# Patient Record
Sex: FEMALE | Race: BLACK OR AFRICAN AMERICAN | Employment: UNEMPLOYED | URBAN - METROPOLITAN AREA
[De-identification: names, ages, dates, MRNs, and addresses within clinical notes are randomized per-mention and may not be internally consistent; named-entity substitution may affect disease eponyms.]

---

## 2022-01-06 ENCOUNTER — HOSPITAL ENCOUNTER (EMERGENCY)
Age: 27
Discharge: HOME | End: 2022-01-06
Attending: EMERGENCY MEDICINE
Payer: COMMERCIAL

## 2022-01-06 VITALS
TEMPERATURE: 98.6 F | WEIGHT: 150 LBS | SYSTOLIC BLOOD PRESSURE: 144 MMHG | OXYGEN SATURATION: 100 % | BODY MASS INDEX: 24.11 KG/M2 | HEIGHT: 66 IN | RESPIRATION RATE: 18 BRPM | DIASTOLIC BLOOD PRESSURE: 93 MMHG | HEART RATE: 65 BPM

## 2022-01-06 DIAGNOSIS — N30.00 ACUTE CYSTITIS WITHOUT HEMATURIA: Primary | ICD-10-CM

## 2022-01-06 LAB
APPEARANCE UR: ABNORMAL
BACTERIA URNS QL MICRO: ABNORMAL /HPF
BILIRUB UR QL: NEGATIVE
CASTS URNS QL MICRO: ABNORMAL /LPF
COLOR UR: YELLOW
EPI CELLS #/AREA URNS HPF: ABNORMAL /HPF
GLUCOSE UR STRIP.AUTO-MCNC: NEGATIVE MG/DL
HCG UR QL: NEGATIVE
HGB UR QL STRIP: ABNORMAL
KETONES UR QL STRIP.AUTO: ABNORMAL MG/DL
LEUKOCYTE ESTERASE UR QL STRIP.AUTO: ABNORMAL
MUCOUS THREADS URNS QL MICRO: ABNORMAL /LPF
NITRITE UR QL STRIP.AUTO: NEGATIVE
OTHER OBSERVATIONS,UCOM: ABNORMAL
PH UR STRIP: 6 [PH] (ref 5–9)
PROT UR STRIP-MCNC: ABNORMAL MG/DL
RBC #/AREA URNS HPF: ABNORMAL /HPF
SP GR UR REFRACTOMETRY: 1.03 (ref 1–1.02)
UROBILINOGEN UR QL STRIP.AUTO: 1 EU/DL (ref 0.2–1)
WBC URNS QL MICRO: >100 /HPF

## 2022-01-06 PROCEDURE — 87086 URINE CULTURE/COLONY COUNT: CPT

## 2022-01-06 PROCEDURE — 87088 URINE BACTERIA CULTURE: CPT

## 2022-01-06 PROCEDURE — 81025 URINE PREGNANCY TEST: CPT

## 2022-01-06 PROCEDURE — 99284 EMERGENCY DEPT VISIT MOD MDM: CPT

## 2022-01-06 PROCEDURE — 87186 SC STD MICRODIL/AGAR DIL: CPT

## 2022-01-06 PROCEDURE — 81001 URINALYSIS AUTO W/SCOPE: CPT

## 2022-01-06 RX ORDER — CEPHALEXIN 500 MG/1
500 CAPSULE ORAL 2 TIMES DAILY
Qty: 14 CAPSULE | Refills: 0 | Status: SHIPPED | OUTPATIENT
Start: 2022-01-06 | End: 2022-01-13

## 2022-01-06 NOTE — ED TRIAGE NOTES
Patient to triage with c/o foul smell with her urine that started about 2 weeks ago. Patient denies any vaginal discharge. States she mild off and on pelvic pain.

## 2022-01-06 NOTE — ED NOTES
I have reviewed discharge instructions with the patient. The patient verbalized understanding. Patient left ED via Discharge Method: ambulatory to Home with daughter. Opportunity for questions and clarification provided. Patient given 1 scripts. Keflex. Work excuse given. Push fluids. To continue your aftercare when you leave the hospital, you may receive an automated call from our care team to check in on how you are doing. This is a free service and part of our promise to provide the best care and service to meet your aftercare needs.  If you have questions, or wish to unsubscribe from this service please call [phone]. Thank you for Choosing our [facility].

## 2022-01-06 NOTE — Clinical Note
[id number] [address] EMERGENCY DEPT  [address] [address] [phone]    Work/School Note    Date: 1/6/2022    To Whom It May concern:      Fabian Limon was seen and treated today in the emergency room by the following provider(s):  Attending Provider: Gibran Pearson DO  Nurse Practitioner: Angelica Baker NP. Fabian Limon is excused from work/school on 01/06/22. She is clear to return to work/school on 01/07/22.         Sincerely,          Micaela Lopez NP

## 2022-01-06 NOTE — DISCHARGE INSTRUCTIONS
Start Keflex twice a day for 7 days for treatment of urinary tract infection. Ensure drink lots of fluids. Follow-up with primary care or return to the ED for any new or worsening symptoms.

## 2022-01-06 NOTE — ED PROVIDER NOTES
PHUONG Diaz is a  24yo F, here for medical check up. Reports urinary sx and has concern for UTI x2 weeks. Malodorous urine, dysuria, urgency with suprapubic discomfort. No NVD, CP, SOB, no fever. No vaginal d/c. No concern for STDs. LMP irregular, last in December, no BC    No past medical history on file. No past surgical history on file. No family history on file. Social History     Socioeconomic History    Marital status: SINGLE     Spouse name: Not on file    Number of children: Not on file    Years of education: Not on file    Highest education level: Not on file   Occupational History    Not on file   Tobacco Use    Smoking status: Never Smoker    Smokeless tobacco: Never Used   Substance and Sexual Activity    Alcohol use: No    Drug use: No    Sexual activity: Not on file   Other Topics Concern    Not on file   Social History Narrative    Not on file     Social Determinants of Health     Financial Resource Strain:     Difficulty of Paying Living Expenses: Not on file   Food Insecurity:     Worried About Running Out of Food in the Last Year: Not on file    Mya of Food in the Last Year: Not on file   Transportation Needs:     Lack of Transportation (Medical): Not on file    Lack of Transportation (Non-Medical):  Not on file   Physical Activity:     Days of Exercise per Week: Not on file    Minutes of Exercise per Session: Not on file   Stress:     Feeling of Stress : Not on file   Social Connections:     Frequency of Communication with Friends and Family: Not on file    Frequency of Social Gatherings with Friends and Family: Not on file    Attends Jainism Services: Not on file    Active Member of Clubs or Organizations: Not on file    Attends Club or Organization Meetings: Not on file    Marital Status: Not on file   Intimate Partner Violence:     Fear of Current or Ex-Partner: Not on file    Emotionally Abused: Not on file    Physically Abused: Not on file   [facility] Sexually Abused: Not on file   Housing Stability:     Unable to Pay for Housing in the Last Year: Not on file    Number of Places Lived in the Last Year: Not on file    Unstable Housing in the Last Year: Not on file         ALLERGIES: Patient has no known allergies. Review of Systems   Constitutional: Negative for activity change, appetite change and fever. HENT: Negative for congestion and sore throat. Respiratory: Negative for shortness of breath. Gastrointestinal: Positive for abdominal pain (suprapubic pain). Negative for diarrhea, nausea and vomiting. Genitourinary: Positive for dysuria, frequency and urgency. Negative for difficulty urinating and vaginal discharge. Musculoskeletal: Negative for arthralgias. Skin: Negative for wound. Neurological: Negative for headaches. Hematological: Negative. There were no vitals filed for this visit. Physical Exam  Constitutional:       Appearance: Normal appearance. She is not ill-appearing. HENT:      Mouth/Throat:      Mouth: Mucous membranes are moist.      Pharynx: Oropharynx is clear. Eyes:      Pupils: Pupils are equal, round, and reactive to light. Cardiovascular:      Rate and Rhythm: Normal rate and regular rhythm. Pulmonary:      Effort: Pulmonary effort is normal. No respiratory distress. Breath sounds: Normal breath sounds. Abdominal:      General: Bowel sounds are normal.      Tenderness: There is no abdominal tenderness. There is no guarding. Comments: nonsurgical abd     Musculoskeletal:      Cervical back: Normal range of motion. Skin:     General: Skin is warm. Neurological:      General: No focal deficit present. Mental Status: She is alert and oriented to person, place, and time. Mental status is at baseline. Psychiatric:         Mood and Affect: Mood normal.         Thought Content:  Thought content normal.      Comments: Patient is well appearing          ELLI    Satish Mcallister is a  24yo F, here for medical check up. No urinary complaints. Urine ordered from triage is concerning for acute cystitis. Moderate leuks. Negative for nitrites. Will send for culture. Keflex ordered. Strict return precautions given, safe for d/c at this time. Vitals stable. Dispo: Stable, Discharge to home    Diagnosis:   1. Acute cystitis. 2.    Chana Bailey NP  3:46 PM      Judah Apgar, NP; 2022 @3:46 PM Voice dictation software was used during the making of this note. This software is not perfect and grammatical and other typographical errors may be present.   This note has not been proofread for errors.  ===================================================================

## 2022-01-09 LAB
BACTERIA SPEC CULT: ABNORMAL
BACTERIA SPEC CULT: ABNORMAL
SERVICE CMNT-IMP: ABNORMAL

## 2022-01-10 NOTE — PROGRESS NOTES
ED pharmacy student  reviewed recent results of urine culture. The patient received a prescription for cephalexin 500mg BID for 7 days  upon discharge. Based on culture results, the patient has been adequately treated for the identified infection with existing antimicrobial therapy. No further intervention needed. Allergies as of 01/06/2022    (No Known Allergies)     Neida Parmar PharmD Candidate    I agree with the pharmacy student's assessment and treatment plan.     Vidal Hanna, PharmD  Emergency Medicine Clinical Pharmacist

## 2022-03-15 ENCOUNTER — HOSPITAL ENCOUNTER (EMERGENCY)
Age: 27
Discharge: HOME | End: 2022-03-15
Attending: EMERGENCY MEDICINE
Payer: COMMERCIAL

## 2022-03-15 VITALS
OXYGEN SATURATION: 98 % | WEIGHT: 150 LBS | BODY MASS INDEX: 24.11 KG/M2 | RESPIRATION RATE: 14 BRPM | DIASTOLIC BLOOD PRESSURE: 86 MMHG | SYSTOLIC BLOOD PRESSURE: 126 MMHG | HEIGHT: 66 IN | HEART RATE: 74 BPM | TEMPERATURE: 98.7 F

## 2022-03-15 DIAGNOSIS — H10.31 ACUTE BACTERIAL CONJUNCTIVITIS OF RIGHT EYE: Primary | ICD-10-CM

## 2022-03-15 PROCEDURE — 99283 EMERGENCY DEPT VISIT LOW MDM: CPT

## 2022-03-15 PROCEDURE — 74011000250 HC RX REV CODE- 250: Performed by: PHYSICIAN ASSISTANT

## 2022-03-15 RX ORDER — CIPROFLOXACIN HYDROCHLORIDE 3.5 MG/ML
1 SOLUTION/ DROPS TOPICAL 2 TIMES DAILY
Qty: 2.5 ML | Refills: 0 | Status: SHIPPED | OUTPATIENT
Start: 2022-03-15 | End: 2022-03-25

## 2022-03-15 RX ORDER — TETRACAINE HYDROCHLORIDE 5 MG/ML
1 SOLUTION OPHTHALMIC
Status: COMPLETED | OUTPATIENT
Start: 2022-03-15 | End: 2022-03-15

## 2022-03-15 RX ADMIN — TETRACAINE HYDROCHLORIDE 1 DROP: 5 SOLUTION OPHTHALMIC at 08:48

## 2022-03-15 RX ADMIN — FLUORESCEIN SODIUM 1 STRIP: 1 STRIP OPHTHALMIC at 08:49

## 2022-03-15 NOTE — ED PROVIDER NOTES
HPI Cisco Fleischer is 31 y.o. female who presents to the emergency department for evaluation of right eye pain. She complains of 4 day history of right eye redness. She reports associated pain, drainage and photophobia. She also notes purulent drainage upon waking in the morning. She believes she has conjunctivitis. She wears contact lenses, not wearing them currently. She denies any trauma or possibility of foreign body. She denies aggravating or alleviating factors. No past medical history on file. No past surgical history on file. No family history on file. Social History     Socioeconomic History    Marital status: SINGLE     Spouse name: Not on file    Number of children: Not on file    Years of education: Not on file    Highest education level: Not on file   Occupational History    Not on file   Tobacco Use    Smoking status: Never Smoker    Smokeless tobacco: Never Used   Substance and Sexual Activity    Alcohol use: No    Drug use: No    Sexual activity: Not on file   Other Topics Concern    Not on file   Social History Narrative    Not on file     Social Determinants of Health     Financial Resource Strain:     Difficulty of Paying Living Expenses: Not on file   Food Insecurity:     Worried About Running Out of Food in the Last Year: Not on file    Mya of Food in the Last Year: Not on file   Transportation Needs:     Lack of Transportation (Medical): Not on file    Lack of Transportation (Non-Medical):  Not on file   Physical Activity:     Days of Exercise per Week: Not on file    Minutes of Exercise per Session: Not on file   Stress:     Feeling of Stress : Not on file   Social Connections:     Frequency of Communication with Friends and Family: Not on file    Frequency of Social Gatherings with Friends and Family: Not on file    Attends Scientology Services: Not on file    Active Member of Clubs or Organizations: Not on file    Attends Club or Organization Meetings: Not on file    Marital Status: Not on file   Intimate Partner Violence:     Fear of Current or Ex-Partner: Not on file    Emotionally Abused: Not on file    Physically Abused: Not on file    Sexually Abused: Not on file   Housing Stability:     Unable to Pay for Housing in the Last Year: Not on file    Number of [place] in the Last Year: Not on file    Unstable Housing in the Last Year: Not on file         ALLERGIES: Patient has no known allergies. Review of Systems   Eyes: Positive for photophobia, pain, redness and visual disturbance. All other systems reviewed and are negative. Vitals:    03/15/22 0837   BP: 126/86   Pulse: 74   Resp: 14   Temp: 98.7 °F (37.1 °C)   SpO2: 98%   Weight: 68 kg (150 lb)   Height: 5' 6\" (1.676 m)            Physical Exam  Vitals and nursing note reviewed. Constitutional:       Appearance: Normal appearance. Eyes:      Extraocular Movements: Extraocular movements intact. Pupils: Pupils are equal, round, and reactive to light. Comments: Conjunctivae is injected. The eye was stained with fluorescin and examined under a wood's lamp. No corneal abrasions. No foreign bodies. Cardiovascular:      Rate and Rhythm: Normal rate. Pulmonary:      Effort: Pulmonary effort is normal.   Musculoskeletal:         General: Normal range of motion. Skin:     General: Skin is warm and dry. Neurological:      General: No focal deficit present. Mental Status: She is alert and oriented to person, place, and time. Psychiatric:         Mood and Affect: Mood normal.          MDM  Number of Diagnoses or Management Options  Acute bacterial conjunctivitis of right eye: new and requires workup  Patient Progress  Patient progress: stable    ED Course as of 03/15/22 0926   Tue Mar 15, 2022   0910 Plan to treat for conjunctivitis with cipro. She is instructed to avoid contact lens wear x 2 weeks. She is discharged to home.  Results, plan of care and return precautions discussed with the patient. They verbalize understanding and ability to comply.     [AE]      ED Course User Index  [AE] ROXANA Pagan       Procedures

## 2022-03-15 NOTE — DISCHARGE INSTRUCTIONS
Medication as prescribed. Do not wear contact lenses x 2 weeks. Return or follow up with your PCP for increasing redness, pain, vision changes, worsening symptoms, other concerns. Orders are placed.  Please have her schedule a fasting lab appt.  Thanks.

## 2022-03-15 NOTE — ED NOTES
I have reviewed discharge instructions with the patient. The patient verbalized understanding. Patient left ED via Discharge Method: ambulatory to Home with self. Opportunity for questions and clarification provided. Patient given 1 scripts. To continue your aftercare when you leave the hospital, you may receive an automated call from our care team to check in on how you are doing. This is a free service and part of our promise to provide the best care and service to meet your aftercare needs.  If you have questions, or wish to unsubscribe from this service please call [phone]. Thank you for Choosing our [facility] Emergency Department.

## 2022-08-28 ENCOUNTER — HOSPITAL ENCOUNTER (EMERGENCY)
Age: 27
Discharge: HOME | End: 2022-08-28
Attending: EMERGENCY MEDICINE
Payer: MEDICAID

## 2022-08-28 VITALS
SYSTOLIC BLOOD PRESSURE: 137 MMHG | HEART RATE: 72 BPM | WEIGHT: 150 LBS | HEIGHT: 66 IN | BODY MASS INDEX: 24.11 KG/M2 | DIASTOLIC BLOOD PRESSURE: 88 MMHG | TEMPERATURE: 98.1 F | RESPIRATION RATE: 18 BRPM | OXYGEN SATURATION: 98 %

## 2022-08-28 DIAGNOSIS — Z11.3 SCREEN FOR STD (SEXUALLY TRANSMITTED DISEASE): Primary | ICD-10-CM

## 2022-08-28 DIAGNOSIS — R30.0 DYSURIA: ICD-10-CM

## 2022-08-28 LAB
HCG UR QL: NEGATIVE
SERVICE CMNT-IMP: NORMAL
WET PREP GENITAL: NORMAL
WET PREP GENITAL: NORMAL

## 2022-08-28 PROCEDURE — 99284 EMERGENCY DEPT VISIT MOD MDM: CPT

## 2022-08-28 PROCEDURE — 87210 SMEAR WET MOUNT SALINE/INK: CPT

## 2022-08-28 PROCEDURE — 96372 THER/PROPH/DIAG INJ SC/IM: CPT

## 2022-08-28 PROCEDURE — 2500000003 HC RX 250 WO HCPCS: Performed by: PHYSICIAN ASSISTANT

## 2022-08-28 PROCEDURE — 81025 URINE PREGNANCY TEST: CPT

## 2022-08-28 PROCEDURE — 6370000000 HC RX 637 (ALT 250 FOR IP): Performed by: PHYSICIAN ASSISTANT

## 2022-08-28 PROCEDURE — 6360000002 HC RX W HCPCS: Performed by: PHYSICIAN ASSISTANT

## 2022-08-28 PROCEDURE — 87491 CHLMYD TRACH DNA AMP PROBE: CPT

## 2022-08-28 RX ORDER — AZITHROMYCIN 250 MG/1
1000 TABLET, FILM COATED ORAL ONCE
Status: COMPLETED | OUTPATIENT
Start: 2022-08-28 | End: 2022-08-28

## 2022-08-28 RX ADMIN — AZITHROMYCIN MONOHYDRATE 1000 MG: 250 TABLET ORAL at 17:28

## 2022-08-28 RX ADMIN — LIDOCAINE HYDROCHLORIDE 500 MG: 10 INJECTION, SOLUTION INFILTRATION; PERINEURAL at 17:23

## 2022-08-28 ASSESSMENT — ENCOUNTER SYMPTOMS
NAUSEA: 0
BACK PAIN: 0
VOMITING: 0
ABDOMINAL PAIN: 0
SHORTNESS OF BREATH: 0

## 2022-08-28 ASSESSMENT — PAIN - FUNCTIONAL ASSESSMENT: PAIN_FUNCTIONAL_ASSESSMENT: NONE - DENIES PAIN

## 2022-08-28 NOTE — DISCHARGE INSTRUCTIONS
We would love to help you get a primary care doctor for follow-up after your emergency department visit. Please call [phone] between 7AM - 6PM Monday to Friday. A care navigator will be able to assist you with setting up a doctor close to your home. Urine was normal here today without evidence of UTI. Gonorrhea and Chlamydia testing is pending. We have treated you here today but she will still be notified of your results in a couple days time. Follow-up with your family doctor. Return to the ED as needed.

## 2022-08-28 NOTE — ED TRIAGE NOTES
Patient presents to the ED for STD testing. Reports \"feels weird when I pee. \" When asked directly if she has concerns regarding STDs, the patient reports yes but doesn't elaborate.

## 2022-08-28 NOTE — ED PROVIDER NOTES
[facility] Emergency Department Provider Note                   PCP:                None Provider               Age: 28 y.o. Sex: female       ICD-10-CM    1. Screen for STD (sexually transmitted disease)  Z11.3       2. Dysuria  R30.0           DISPOSITION Decision To Discharge 08/28/2022 05:17:33 PM        MDM  Number of Diagnoses or Management Options  Screen for STD (sexually transmitted disease)  Diagnosis management comments: Patient is a 79-year-old female who presents episode today with some dysuria. Vital signs are stable. Urine shows no signs of infection. Gonorrhea and Chlamydia testing pending. Patient's concern was high enough she preferred to be treated here today. Patient given Rocephin IM and some oral azithromycin due to concerns about treating with doxycycline for 7 days and the patient's reliability to do so appropriately. We will notify the patient of those results when they return. Encourage patient to avoid intercourse for the next 10 days to allow for adequate treatment. Results discussed with the patient here today as well as plan of care to which she is agreeable. Patient ambulatory upon discharge in stable condition out of the department. Amount and/or Complexity of Data Reviewed  Clinical lab tests: ordered and reviewed  Tests in the medicine section of CPT®: reviewed and ordered  Review and summarize past medical records: yes  Independent visualization of images, tracings, or specimens: yes    Risk of Complications, Morbidity, and/or Mortality  Presenting problems: low  Diagnostic procedures: low  Management options: low  General comments: The patient was observed in the ED.     Results Reviewed:      Recent Results (from the past 24 hour(s))  -Wet prep, genital:   Collection Time: 08/28/22  3:55 PM  Specimen: Vaginal       Result                      Value             Ref Range           Special Requests                                              NO SPECIAL REQUESTS       Wet Prep                                                      NO YEAST,TRICHOMONAS OR CLUE CELLS NOTED       Wet Prep                                                      1 TO 5 WBC PER HPF  -POC Pregnancy Urine Qual:   Collection Time: 08/28/22  4:01 PM       Result                      Value             Ref Range           Preg Test, Ur               Negative          NEG                Patient Progress  Patient progress: stable       Orders Placed This Encounter   Procedures    C.trachomatis N.gonorrhoeae DNA    Wet prep, genital    POCT Urine Dipstick    POC PREGNANCY UR-QUAL    POC Pregnancy Urine Qual        Medications   azithromycin (ZITHROMAX) tablet 1,000 mg (has no administration in time range)   cefTRIAXone (ROCEPHIN) 500 mg in lidocaine 1 % 1.4286 mL IM Injection (500 mg IntraMUSCular Given 8/28/22 1723)       New Prescriptions    No medications on file        Katie Ta is a 28 y.o. female who presents to the Emergency Department with chief complaint of    Chief Complaint   Patient presents with    Exposure to STD     Patient presents to the ED for STD testing. Reports \"feels weird when I pee. \" When asked directly if she has concerns regarding STDs, the patient reports yes but doesn't elaborate. Patient is a 79-year-old female who presents to facility today after having a \"not normal feeling while urinating\" that started today. She denies any other complaints here today. She states she would like to be tested for STDs. She denies any known exposures. Patient would not elaborate any further on her concern. Patient has no fevers, chills, frequency, urgency, abdominal pain, vaginal bleeding or discharge, or any other symptoms here today. The history is provided by the patient. Review of Systems   Constitutional:  Negative for chills and fever. Respiratory:  Negative for shortness of breath. Cardiovascular:  Negative for chest pain.    Gastrointestinal:  Negative for abdominal pain, nausea and vomiting. Genitourinary:  Positive for dysuria. Negative for frequency and urgency. Musculoskeletal:  Negative for back pain and gait problem. Neurological:  Negative for dizziness, syncope and headaches. Psychiatric/Behavioral:  Negative for agitation and behavioral problems. All other systems reviewed and are negative. No past medical history on file. No past surgical history on file. No family history on file. Social History     Socioeconomic History    Marital status: Single   Tobacco Use    Smoking status: Never    Smokeless tobacco: Never   Substance and Sexual Activity    Alcohol use: No    Drug use: No         Patient has no known allergies. Previous Medications    No medications on file        Vitals signs and nursing note reviewed. Patient Vitals for the past 4 hrs:   Temp Pulse Resp BP SpO2   08/28/22 1556 98.1 °F (36.7 °C) 72 18 137/88 98 %          Physical Exam  Vitals and nursing note reviewed. Constitutional:       General: She is not in acute distress. Appearance: Normal appearance. She is not ill-appearing. HENT:      Head: Normocephalic and atraumatic. Right Ear: External ear normal.      Left Ear: External ear normal.   Eyes:      Extraocular Movements: Extraocular movements intact. Conjunctiva/sclera: Conjunctivae normal.   Cardiovascular:      Rate and Rhythm: Normal rate and regular rhythm. Pulses: Normal pulses. Heart sounds: Normal heart sounds. Pulmonary:      Effort: Pulmonary effort is normal.      Breath sounds: Normal breath sounds. Abdominal:      General: Abdomen is flat. Genitourinary:     Comments:     Musculoskeletal:         General: Normal range of motion. Cervical back: Normal range of motion. Skin:     General: Skin is warm and dry. Capillary Refill: Capillary refill takes less than 2 seconds. Neurological:      General: No focal deficit present.       Mental Status: She is alert and oriented to person, place, and time. Psychiatric:         Mood and Affect: Mood normal.         Behavior: Behavior normal.        Procedures      [unfilled]     No orders to display                          Voice dictation software was used during the making of this note. This software is not perfect and grammatical and other typographical errors may be present. This note has not been completely proofread for errors.      [employer], EMMANUEL  08/28/22 0480

## 2022-08-31 LAB
C TRACH RRNA SPEC QL NAA+PROBE: NEGATIVE
N GONORRHOEA RRNA SPEC QL NAA+PROBE: NEGATIVE
SPECIMEN SOURCE: NORMAL

## 2023-06-25 ENCOUNTER — HOSPITAL ENCOUNTER (EMERGENCY)
Age: 28
Discharge: HOME | End: 2023-06-25
Attending: EMERGENCY MEDICINE
Payer: COMMERCIAL

## 2023-06-25 VITALS
SYSTOLIC BLOOD PRESSURE: 131 MMHG | HEIGHT: 66 IN | BODY MASS INDEX: 29.32 KG/M2 | OXYGEN SATURATION: 98 % | HEART RATE: 71 BPM | RESPIRATION RATE: 18 BRPM | WEIGHT: 182.4 LBS | DIASTOLIC BLOOD PRESSURE: 88 MMHG | TEMPERATURE: 98.6 F

## 2023-06-25 DIAGNOSIS — N92.6 IRREGULAR MENSES: ICD-10-CM

## 2023-06-25 DIAGNOSIS — N39.0 URINARY TRACT INFECTION IN FEMALE: Primary | ICD-10-CM

## 2023-06-25 DIAGNOSIS — A59.9 TRICHOMONAS INFECTION: ICD-10-CM

## 2023-06-25 LAB
APPEARANCE UR: ABNORMAL
BACTERIA URNS QL MICRO: ABNORMAL /HPF
BILIRUB UR QL: NEGATIVE
CASTS URNS QL MICRO: 0 /LPF
COLOR UR: ABNORMAL
CRYSTALS URNS QL MICRO: 0 /LPF
EPI CELLS #/AREA URNS HPF: ABNORMAL /HPF
GLUCOSE UR STRIP.AUTO-MCNC: NEGATIVE MG/DL
HCG SERPL QL: NEGATIVE
HCG UR QL: NEGATIVE
HGB UR QL STRIP: ABNORMAL
KETONES UR QL STRIP.AUTO: NEGATIVE MG/DL
LEUKOCYTE ESTERASE UR QL STRIP.AUTO: ABNORMAL
MUCOUS THREADS URNS QL MICRO: 0 /LPF
NITRITE UR QL STRIP.AUTO: POSITIVE
OTHER OBSERVATIONS: ABNORMAL
PH UR STRIP: 5.5 (ref 5–9)
PROT UR STRIP-MCNC: 100 MG/DL
RBC #/AREA URNS HPF: ABNORMAL /HPF
SERVICE CMNT-IMP: NORMAL
SP GR UR REFRACTOMETRY: 1.02 (ref 1–1.02)
UROBILINOGEN UR QL STRIP.AUTO: 1 EU/DL (ref 0.2–1)
WBC URNS QL MICRO: ABNORMAL /HPF
WET PREP GENITAL: NORMAL

## 2023-06-25 PROCEDURE — 87591 N.GONORRHOEAE DNA AMP PROB: CPT

## 2023-06-25 PROCEDURE — 99284 EMERGENCY DEPT VISIT MOD MDM: CPT

## 2023-06-25 PROCEDURE — 6360000002 HC RX W HCPCS: Performed by: NURSE PRACTITIONER

## 2023-06-25 PROCEDURE — 87210 SMEAR WET MOUNT SALINE/INK: CPT

## 2023-06-25 PROCEDURE — 96372 THER/PROPH/DIAG INJ SC/IM: CPT

## 2023-06-25 PROCEDURE — 87491 CHLMYD TRACH DNA AMP PROBE: CPT

## 2023-06-25 PROCEDURE — 81025 URINE PREGNANCY TEST: CPT

## 2023-06-25 PROCEDURE — 2500000003 HC RX 250 WO HCPCS: Performed by: NURSE PRACTITIONER

## 2023-06-25 PROCEDURE — 84703 CHORIONIC GONADOTROPIN ASSAY: CPT

## 2023-06-25 PROCEDURE — 81001 URINALYSIS AUTO W/SCOPE: CPT

## 2023-06-25 RX ORDER — ONDANSETRON 4 MG/1
4 TABLET, FILM COATED ORAL 3 TIMES DAILY PRN
Qty: 9 TABLET | Refills: 0 | Status: SHIPPED | OUTPATIENT
Start: 2023-06-25 | End: 2023-06-28

## 2023-06-25 RX ORDER — CEPHALEXIN 500 MG/1
500 CAPSULE ORAL 2 TIMES DAILY
Qty: 14 CAPSULE | Refills: 0 | Status: SHIPPED | OUTPATIENT
Start: 2023-06-25 | End: 2023-07-02

## 2023-06-25 RX ORDER — METRONIDAZOLE 500 MG/1
500 TABLET ORAL 2 TIMES DAILY
Qty: 14 TABLET | Refills: 0 | Status: SHIPPED | OUTPATIENT
Start: 2023-06-25 | End: 2023-07-02

## 2023-06-25 RX ORDER — DOXYCYCLINE HYCLATE 100 MG
100 TABLET ORAL 2 TIMES DAILY
Qty: 14 TABLET | Refills: 0 | Status: SHIPPED | OUTPATIENT
Start: 2023-06-25 | End: 2023-07-02

## 2023-06-25 RX ADMIN — CEFTRIAXONE SODIUM 500 MG: 500 INJECTION, POWDER, FOR SOLUTION INTRAMUSCULAR; INTRAVENOUS at 16:27

## 2023-06-25 ASSESSMENT — PAIN SCALES - GENERAL: PAINLEVEL_OUTOF10: 5

## 2023-06-25 ASSESSMENT — LIFESTYLE VARIABLES
HOW OFTEN DO YOU HAVE A DRINK CONTAINING ALCOHOL: NEVER
HOW MANY STANDARD DRINKS CONTAINING ALCOHOL DO YOU HAVE ON A TYPICAL DAY: PATIENT DOES NOT DRINK

## 2023-06-25 ASSESSMENT — PAIN - FUNCTIONAL ASSESSMENT: PAIN_FUNCTIONAL_ASSESSMENT: 0-10

## 2023-11-30 ENCOUNTER — HOSPITAL ENCOUNTER (EMERGENCY)
Age: 28
Discharge: LEFT BEFORE DISCHARGE | End: 2023-11-30
Payer: COMMERCIAL

## 2023-11-30 VITALS
RESPIRATION RATE: 16 BRPM | SYSTOLIC BLOOD PRESSURE: 157 MMHG | TEMPERATURE: 97.7 F | HEIGHT: 66 IN | BODY MASS INDEX: 29.73 KG/M2 | DIASTOLIC BLOOD PRESSURE: 89 MMHG | OXYGEN SATURATION: 100 % | HEART RATE: 70 BPM | WEIGHT: 185 LBS

## 2023-11-30 LAB
APPEARANCE UR: CLEAR
BACTERIA URNS QL MICRO: ABNORMAL /HPF
BILIRUB UR QL: NEGATIVE
COLOR UR: ABNORMAL
EPI CELLS #/AREA URNS HPF: ABNORMAL /HPF
GLUCOSE UR STRIP.AUTO-MCNC: NEGATIVE MG/DL
HCG UR QL: NEGATIVE
HGB UR QL STRIP: ABNORMAL
KETONES UR QL STRIP.AUTO: 15 MG/DL
LEUKOCYTE ESTERASE UR QL STRIP.AUTO: NEGATIVE
MUCOUS THREADS URNS QL MICRO: ABNORMAL /LPF
NITRITE UR QL STRIP.AUTO: NEGATIVE
PH UR STRIP: 6 (ref 5–9)
PROT UR STRIP-MCNC: ABNORMAL MG/DL
RBC #/AREA URNS HPF: ABNORMAL /HPF
SERVICE CMNT-IMP: NORMAL
SP GR UR REFRACTOMETRY: 1.03 (ref 1–1.02)
UROBILINOGEN UR QL STRIP.AUTO: 1 EU/DL (ref 0.2–1)
WBC URNS QL MICRO: ABNORMAL /HPF
WET PREP GENITAL: NORMAL

## 2023-11-30 PROCEDURE — 87491 CHLMYD TRACH DNA AMP PROBE: CPT

## 2023-11-30 PROCEDURE — 87210 SMEAR WET MOUNT SALINE/INK: CPT

## 2023-11-30 PROCEDURE — 87086 URINE CULTURE/COLONY COUNT: CPT

## 2023-11-30 PROCEDURE — 87591 N.GONORRHOEAE DNA AMP PROB: CPT

## 2023-11-30 PROCEDURE — 4500000002 HC ER NO CHARGE

## 2023-11-30 PROCEDURE — 81025 URINE PREGNANCY TEST: CPT

## 2023-11-30 PROCEDURE — 81001 URINALYSIS AUTO W/SCOPE: CPT

## 2023-11-30 ASSESSMENT — PAIN - FUNCTIONAL ASSESSMENT: PAIN_FUNCTIONAL_ASSESSMENT: 0-10

## 2023-11-30 ASSESSMENT — LIFESTYLE VARIABLES: HOW MANY STANDARD DRINKS CONTAINING ALCOHOL DO YOU HAVE ON A TYPICAL DAY: PATIENT DOES NOT DRINK

## 2023-11-30 ASSESSMENT — PAIN SCALES - GENERAL: PAINLEVEL_OUTOF10: 0
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
## 2023-11-30 NOTE — ED NOTES
[facility]  INTERVENTIONAL PAIN MANAGEMENT FOLLOW UP    CHIEF COMPLAINT:  Follow-up and Office Visit       PERTINENT CARE TEAM:    PCP: Fuad Walton MD               Previous: [facility]        SUBJECTIVE:    Sandy Gonsalez is a 39 year old female seen today for follow up for chronic generalized pain which began years previously, as well as neck and low back pain. This is my first visit with the patient, as she was seen by JAMIE Saba while I was on maternity leave.  She states that her pain really has localized to the left greater than right low back with occasional tightness sensation in the bilateral shins, worsened by lying down and alleviated by standing, sitting, and walking.  She rates her discomfort 6/10 on average and describes it is achy, stiff, and intermittent in character.    She was last seen via virtual visit on 11/30/20 by JAMIE Saba, at that time she discontinued tizanidine in favor of baclofen, and continued LDN 4.5mg. She has also since started PT and shares this is providing some relief.     Pt denies numbness/tingling/weakness, bowel/bladder incontinence, saddle anesthesia, recent fever, infection, or antibiotic use.     PAIN COURSE AND PREVIOUS INTERVENTIONS:  Medications: LDN 4.5 mg [Aripiprazole, Sertraline, Ambien, Lorazepam]  Past:   Tizanidine (ineffective)  Flexeril (ineffective)  Lidocaine patches  Meloxicam  Trazodone  Voltaren gel- not helpful   Gabapentin and tegretol- ineffective  Lyrica- tinnitus  Cymbalta  Oxycodone              Mirapex-did not want to try d/t possible side effects  Topamax: Blurred vision, sees yellow     Interventions: 9/20/16: B/L L4-5, L5-S1 facet joint injection (Dr Lopez)  Adjuvants: TENS unit, Physical therapy and chiropractic care with some benefit     BLOOD THINNING MEDICATIONS:  Naproxen     Pertinent Surgical History:  2012 bilateral total mastectomy     Mental Health/Substance Use  No answer for discharge.  RN checking with PA about possible prescriptions     Warren Gonzalez RN  11/30/23 2967 History:  Anxiety  (Aripiprazole, Sertraline, Ambien, Lorazepam)  PTSD  Schizoaffective disorder  Major Depressive Disorder  Insomnia  Nicotine dependence  Hx of suicidal/ homicidal ideation      Pertinent Comorbidities:  Fibromyalgia  Asthma  Prediabetes  Hyperthyroidism  Hx breast cancer s/p b/l mastectomy 2012    MEDICATIONS:    Current Outpatient Medications   Medication Sig Dispense Refill   • methIMAzole (TAPAZOLE) 5 MG tablet Take 0.5 tablets by mouth daily. 45 tablet 3   • baclofen (LIORESAL) 10 MG tablet Take 1 tablet by mouth 3 times daily as needed (muscle spasm). 90 tablet 1   • naltrexone 4.5 mg capsule Take 1 capsule by mouth daily. 30 g 5   • ergocalciferol (Drisdol) 1.25 mg (50,000 units) capsule Take 1 capsule by mouth 1 day a week. 12 capsule 3   • sertraline (ZOLOFT) 50 MG tablet Take 1 tablet by mouth daily. PATIENT NEEDS TO MAKE AN APPT. 30 tablet 1   • hydrOXYzine (ATARAX) 50 MG tablet Take 1 tablet by mouth 3 times daily as needed for Anxiety. 90 tablet 1   • ARIPiprazole (ABILIFY) 5 MG tablet Take 1 tablet by mouth daily. 30 tablet 1   • diphenhydrAMINE (BENADRYL) 2 % cream Apply topically 3 times daily as needed for Itching. 30 g 0   • pantoprazole (PROTONIX) 40 MG tablet Take 1 tablet by mouth 2 times daily for 14 days. 28 tablet 0   • polyethylene glycol-electrolytes (NULYTELY) 420 g solution Follow instructions in prep letter 4000 mL 0   • dicyclomine (BENTYL) 20 MG tablet Take 1 tablet by mouth 4 times daily (before meals and nightly). 360 tablet 1   • pantoprazole (PROTONIX) 20 MG tablet Take 1 tablet by mouth daily (before breakfast). 90 tablet 1     No current facility-administered medications for this visit.        PROBLEM LIST:    Patient Active Problem List   Diagnosis   • Headache(784.0)   • Carpal tunnel syndrome   • Positive MRSA culture Lf thigh pustule   • Nicotine dependence   • Breast cancer (CMS/HCC)   • Acquired absence of both breasts and nipples   • Posttraumatic stress  disorder   • Chronic leg pain   • PTSD (post-traumatic stress disorder)   • Peripheral edema   • Unspecified vitamin D deficiency   • Myositis   • Elevated CPK   • Hallucinations   • Suicidal ideation   • Homicidal ideation   • Chronic pain   • Major depressive disorder, recurrent episode, moderate (CMS/HCC)   • Multiple thyroid nodules   • Nonspecific abnormal results of thyroid function study   • Dysplasia of cervix, low grade (JAY 1)   • Fibromyalgia   • Schizoaffective disorder, depressive type (CMS/HCC)   • Hyperthyroidism   • Right thyroid nodule   • Anxiety   • Migraine   • Segmental and somatic dysfunction of lumbar region   • Segmental and somatic dysfunction of thoracic region   • Closed dislocation, multiple cervical vertebrae   • Mild persistent asthma without complication   • Malignant neoplasm of right female breast (CMS/HCC)   • Goiter diffuse   • Family history of cancer   • Chronic bilateral low back pain without sciatica   • Gastroesophageal reflux disease without esophagitis   • Candidal enteritis   • BRCA negative   • Myofascial muscle pain   • Segmental dysfunction of lumbar region   • Segmental dysfunction of pelvic region   • Segmental dysfunction of sacral region   • Segmental dysfunction of thoracic region   • Prediabetes   • RICHARD (generalized anxiety disorder)   • Insomnia due to mental disorder   • Personal history of malignant neoplasm of breast   • Capsular contracture of breast implant   • Deformity and disproportion of reconstructed breast   • Mild intermittent asthma without complication       HISTORIES:    ALLERGIES:   Allergen Reactions   • Acetaminophen Other (See Comments)     Pt does not know- states she was just told to avoid   • Adhesive   (Environmental) RASH     IE: adhesive tape, bandaids   • Dayquil [Day Time] RASH   • Dichloralphenazone Other (See Comments)     (an ingredient in Midrin)   • Geodon [Ziprasidone Hydrochloride] Tinnitus   • Hydrocodone HIVES   • Ibuprofen  Other (See Comments)     Unsure of reaction   • Isometheptene Mucate Other (See Comments)     (an ingredient in midrin)   • Lyrica Other (See Comments)     Ear ringing   • Meloxicam Other (See Comments)     Ear ringing   • Midrin      Inflammation of lips   • Morphine Other (See Comments)     hallucinations       Past Medical History:   Diagnosis Date   • Allergy    • Arthritis    • Asthma    • Bipolar disorder (CMS/[facility])    • Breast cancer (CMS/[facility]) 06/13/2012    right  breast poorly differentiated invasive ductal   • Chronic tension-type headache    • Closed bimalleolar fracture 01/02/2008    R ankle   • Closed fracture of metatarsal bone(s) 01/02/2008    R 5th metatarsal   • Depression    • Fibromyalgia     lower back   • Gastroesophageal reflux disease    • GBS carrier    • Graves disease    • Lupus (CMS/[facility])    • Mild cervical dysplasia 2014   • MRSA (methicillin resistant Staphylococcus aureus) infection  6/28/09 thigh+    History Code resolved by Infection Prevention 4/4/14   • Myositis    • Neuropathy due to chemotherapeutic drug (CMS/[facility])    • Positive MRSA culture Lf thigh pustule 9/18/10    also had several other pustules - forearm, etc. states areas are healed now.   • Prediabetes    • PTSD (post-traumatic stress disorder)    • Sinus problem     chronic   • Thyroid nodule    • Vitamin D deficiency       Past Surgical History:   Procedure Laterality Date   • Abdomen surgery     • Breast implants-replacement 2 hrs  4/30/2014    bilateral remove implants and place new larger implants.  Bilateral fat grafting- Dr Carranza   • Breast reconstruction  8/8/2012    immediate reconstruction silicone implants - Dr Carranza   • Breast surgery  6/13/2012    right breast biopsy   • Colonoscopy  01/08/2021    dr domínguez recall 10 yr   • Colonoscopy w biopsy  07/14/2009   • Cosmetic surgery     • Egd  01/07/2021    dr domínguez   • Esophagogastroduodenoscopy transoral flex diag  8/7/14    Dyspepsia   • Mastectomy  8/2012     bilateral (with right sentinel node bx)   • Nipple/areola reconstruction  2/25/2013    bilateral nipple reconstruction/alloderm - Dr Carranza   • Revision of reconstructed breast  1/18/2016    Bilateral revision of breast reconstruction with extensive soft tissue release and extensive capsulotomies- Dr Carranza   • Skin biopsy      thigh myosititis check   • Tubal ligation  10/01/2011   • [place] tooth extraction          Social History     Socioeconomic History   • Marital status: Single     Spouse name: Not on file   • Number of children: 3   • Years of education: 13+   • Highest education level: Not on file   Occupational History   • Occupation: unemployed     Employer: OTHER   Social Needs   • Financial resource strain: Not on file   • Food insecurity     Worry: Not on file     Inability: Not on file   • Transportation needs     Medical: Not on file     Non-medical: Not on file   Tobacco Use   • Smoking status: Current Every Day Smoker     Packs/day: 0.50     Years: 10.00     Pack years: 5.00     Types: Cigarettes   • Smokeless tobacco: Never Used   • Tobacco comment: trying to cut back   Substance and Sexual Activity   • Alcohol use: Yes     Alcohol/week: 0.0 standard drinks     Frequency: Monthly or less     Drinks per session: 1 or 2     Binge frequency: Never     Comment: socially   • Drug use: Yes     Types: Marijuana   • Sexual activity: Yes     Partners: Male     Birth control/protection: Surgical     Comment: tubal ligation   Lifestyle   • Physical activity     Days per week: Not on file     Minutes per session: Not on file   • Stress: Not on file   Relationships   • Social connections     Talks on phone: Not on file     Gets together: Not on file     Attends [employer] service: Not on file     Active member of club or organization: Not on file     Attends meetings of clubs or organizations: Not on file     Relationship status: Not on file   • Intimate partner violence     Fear of current or ex partner:  Not on file     Emotionally abused: Not on file     Physically abused: Not on file     Forced sexual activity: Not on file   Other Topics Concern   •  Service No   • Blood Transfusions No   • Caffeine Concern Yes   • Occupational Exposure No   • Hobby Hazards No   • Sleep Concern Yes   • Stress Concern Yes   • Weight Concern No   • Special Diet No   • Back Care No   • Exercise No   • Bike Helmet No   • Seat Belt Yes   • Self-Exams Yes   Social History Narrative   • Not on file       I have reviewed the family history and social history as listed in the medical record as obtained by my nursing staff and support staff and agree with their documentation.    REVIEW OF SYSTEMS:    Reviewed. As per RN note and my History of Present Illness above      OBJECTIVE:    PHYSICAL EXAMINATION:  Vitals:   Visit Vitals  /67   Pulse 86   LMP 12/02/2020 (Approximate) Comment: waiver signed 1/8/2021   SpO2 99%       Constitutional: Pleasant,  Well-developed, well-nourished female in no acute distress. Gait is antalgic but not ataxic  Head: normocephalic, atraumatic  Eye: PERRL. Conjuctiva non-injected  Skin: Warm, dry, intact without rash or lesion.  Psych: The patient's mood is flat.  Cardiovascular: well-perfused extremities, no peripheral edema  Pulmonary:  Non-labored respirations, no stridor.  Abdomen: Non-distended  Neurologic: Coordination intact, Facial nerves intact.  Musculoskeletal:     MOTOR EXAMINATION:       LOWER EXTREMITY      RIGHT LEFT   Iliopsoas 5/5 5/5   Quadriceps 5/5 5/5   Hamstrings 5/5 5/5   Foot Dorsiflexion 5/5 5/5   Extensor hallucis longus 5/5 5/5   Gastrocnemius 5/5 5/5     No abnormal muscle tone, movements, or tremors noted. No muscular atrophy.    Sensation intact to light touch over bilateral lower extremities    No pain elicited in Right groin with hip abduction/adduction/internal rotation/external rotation     No pain elicited in Left groin with hip abduction/adduction/internal  rotation/external rotation     LUMBAR SPINE:    The thoracolumbar spine has a normal alignment      TENDERNESS   Lumbar paraspinal musculature: tender on the left  Sacroiliac joints: tender L >R    ROM: full extension, flexion, rotation, right lateral bend, left lateral bend    Pain with flexion: negative  Pain with extension: positive    negative lumbar facet loading    Non TTP overlying lumbar facet joints    Straight leg raising is negative bilaterally    + L sided NASRIN     LABORATORY DATA:    PLT   Date Value   01/16/2021 215 K/mcL   02/05/2019 280 K/mcL   02/11/2013 187 K/mcL   03/22/2010 175 K/uL     No results found for: INR  GFR Estimate, Non  (no units)   Date Value   02/05/2019 83     GFR Estimate,  (no units)   Date Value   02/05/2019 >90     Hemoglobin A1C (%)   Date Value   03/06/2020 5.8 (H)   02/05/2019 5.8 (H)         IMAGING STUDIES:       IMAGING STUDIES:    MRI Lumbar Spine 07/13/18  Outside prior lumbar spine MRI of 8/21/2015 has been uploaded for review.  Degenerative findings are grossly similar to prior.  No changes will be made to the prior report.   FINDINGS:   Vertebrae: 5 lumbar-type vertebral bodies are present with small T12 ribs. Vertebral body heights are preserved. Minimal levocurvature lumbar spine centered at L3.  Bone Marrow:  Normal bone marrow signal for age.  Small subcentimeter probable atypical hemangioma at L1.  No destructive lesions identified.  Soft tissues:  No signal abnormalities.  Posterior paraspinal muscles:  Well preserved without signal abnormality.  Spinal Cord: Conus medullaris terminates at L1.Distal cord and nerve roots are of normal signal intensity.  Degenerative changes: Mild inflammatory degeneration along the superior endplate of L5.  Mild  intervertebral disc height and T2 signal loss at L4-L5 and L5-S1.  Small posterior fissures are also at suggested these levels.  Degenerative changes by level:  T12-L3: No abnormalities.     L3-L4:Minimal asymmetric to left disc bulge without significant spinal canal stenosis.  Minimal bilateral foraminal narrowing due to asymmetric left disc bulge and facet arthropathy.  Patent right foramen.  L4-L5: Asymmetric to left disc bulge with superimposed left subarticular/foraminal disc protrusion.  No significant spinal canal stenosis.  Mild effacement of left lateral recess.  Mild left and minimal right foraminal narrowing due to asymmetric to left disc bulge, left subarticular/foraminal disc protrusion and facet arthropathy.  L5-S1: Minimal asymmetric to left disc bulge without significant spinal canal or foraminal stenosis.     IMPRESSION:    1.  Mild multilevel degenerative disc changes without significant spinal canal stenosis.  Mild effacement of left lateral recess at L4-L5.  2.  Degenerative foraminal narrowing is mild at left L4-L5 and minimal at bilateral L3-L4 and right L4-L5.  3.  Small posterior annular fissures suggested at L4-L5 and L5-S1.       and         XR Lumbar Spine 2 or 3 Views 02/14/20   IMPRESSION: Stable mild degenerative changes.        XR CERVICAL, THORACIC, LUMBAR 03/06/2018  FINDINGS:   CERVICAL SPINE: Mild straightening and reversal the normal cervical lordosis, centered at the C4 level. New mild to moderate spondylotic endplate degenerative changes at the C4-5 level. Associated mild facet and uncovertebral joint arthropathy in the mid cervical spine.  THORACIC SPINE: Mild thoracolumbar curve. Thoracic spine is otherwise radiographically unremarkable. Postoperative changes both breasts.  LUMBAR SPINE: Mild thoracolumbar curve. Mild L4-5 interspace narrowing.  Mild lower lumbar facet degenerative changes.  IMPRESSION:  1.  Mild spondylotic degenerative arthropathy, worsened at the C4-5 level since 2009.  2.  Mild thoracolumbar curve.  3.  Mild L4-5 interspace narrowing.  4.  Postoperative changes both breasts.      I have personally reviewed the images pertinent to this  patient's visit today.    PDMP Reviewed    ASSESSMENT:    1. Lumbosacral spondylosis without myelopathy    2. Sacroiliac joint dysfunction       Sandy Gonsalez is a 39 year old female with a combination of lumbar spondylosis and sacroiliac joint dysfunction, particularly affecting the left side.    PLAN:  RECOMMENDATIONS:  1) Medical Modalities:  No changes  2) Interventional Modalities:  Left sacroiliac joint injection  3) Behavioral Medicine Modalities: none  4) Other Modalities:  SI joint exercises provided, continue physical therapy, Continue Home Exercise Therapy  5) Imaging/Labs:  None  6) Consults:  None      No orders of the defined types were placed in this encounter.      Return for return to clinic 2-3 weeks after procedure.    Instructions provided as documented in the after visit summary.    The above recommendations were provided to the patient. Diagnosis, treatment options, risks, benefits, and alternatives were discussed, and all questions were answered to the patient's satisfaction. The patient expressed understanding of the diagnosis and plan for management and agreed with the plan of care.      Shruthi Andrews MD  Interventional Pain Management  [facility]

## 2023-12-02 LAB
BACTERIA SPEC CULT: NORMAL
BACTERIA SPEC CULT: NORMAL
C TRACH RRNA SPEC QL NAA+PROBE: NEGATIVE
N GONORRHOEA RRNA SPEC QL NAA+PROBE: NEGATIVE
SERVICE CMNT-IMP: NORMAL
SPECIMEN SOURCE: NORMAL

## 2024-01-21 ENCOUNTER — HOSPITAL ENCOUNTER (EMERGENCY)
Age: 29
Discharge: HOME | End: 2024-01-21
Payer: COMMERCIAL

## 2024-01-21 VITALS
DIASTOLIC BLOOD PRESSURE: 83 MMHG | WEIGHT: 178 LBS | HEART RATE: 71 BPM | BODY MASS INDEX: 28.61 KG/M2 | RESPIRATION RATE: 16 BRPM | SYSTOLIC BLOOD PRESSURE: 143 MMHG | OXYGEN SATURATION: 100 % | TEMPERATURE: 98.5 F | HEIGHT: 66 IN

## 2024-01-21 DIAGNOSIS — N93.9 VAGINAL BLEEDING: Primary | ICD-10-CM

## 2024-01-21 LAB
ALBUMIN SERPL-MCNC: 3.8 G/DL (ref 3.5–5)
ALBUMIN/GLOB SERPL: 0.8 (ref 0.4–1.6)
ALP SERPL-CCNC: 70 U/L (ref 50–136)
ALT SERPL-CCNC: 18 U/L (ref 12–65)
ANION GAP SERPL CALC-SCNC: 2 MMOL/L (ref 2–11)
AST SERPL-CCNC: 13 U/L (ref 15–37)
BASOPHILS # BLD: 0.1 K/UL (ref 0–0.2)
BASOPHILS NFR BLD: 1 % (ref 0–2)
BILIRUB SERPL-MCNC: 0.6 MG/DL (ref 0.2–1.1)
BUN SERPL-MCNC: 9 MG/DL (ref 6–23)
CALCIUM SERPL-MCNC: 9.3 MG/DL (ref 8.3–10.4)
CHLORIDE SERPL-SCNC: 110 MMOL/L (ref 103–113)
CO2 SERPL-SCNC: 25 MMOL/L (ref 21–32)
CREAT SERPL-MCNC: 0.96 MG/DL (ref 0.6–1)
DIFFERENTIAL METHOD BLD: ABNORMAL
EOSINOPHIL # BLD: 0.1 K/UL (ref 0–0.8)
EOSINOPHIL NFR BLD: 1 % (ref 0.5–7.8)
ERYTHROCYTE [DISTWIDTH] IN BLOOD BY AUTOMATED COUNT: 12.9 % (ref 11.9–14.6)
GLOBULIN SER CALC-MCNC: 4.8 G/DL (ref 2.8–4.5)
GLUCOSE SERPL-MCNC: 89 MG/DL (ref 65–100)
HCG UR QL: NEGATIVE
HCT VFR BLD AUTO: 39.6 % (ref 35.8–46.3)
HGB BLD-MCNC: 12.8 G/DL (ref 11.7–15.4)
IMM GRANULOCYTES # BLD AUTO: 0 K/UL (ref 0–0.5)
IMM GRANULOCYTES NFR BLD AUTO: 0 % (ref 0–5)
LYMPHOCYTES # BLD: 2.2 K/UL (ref 0.5–4.6)
LYMPHOCYTES NFR BLD: 52 % (ref 13–44)
MCH RBC QN AUTO: 31.5 PG (ref 26.1–32.9)
MCHC RBC AUTO-ENTMCNC: 32.3 G/DL (ref 31.4–35)
MCV RBC AUTO: 97.5 FL (ref 82–102)
MONOCYTES # BLD: 0.4 K/UL (ref 0.1–1.3)
MONOCYTES NFR BLD: 8 % (ref 4–12)
NEUTS SEG # BLD: 1.6 K/UL (ref 1.7–8.2)
NEUTS SEG NFR BLD: 38 % (ref 43–78)
NRBC # BLD: 0 K/UL (ref 0–0.2)
PLATELET # BLD AUTO: 274 K/UL (ref 150–450)
PMV BLD AUTO: 10.3 FL (ref 9.4–12.3)
POTASSIUM SERPL-SCNC: 3.8 MMOL/L (ref 3.5–5.1)
PROT SERPL-MCNC: 8.6 G/DL (ref 6.3–8.2)
RBC # BLD AUTO: 4.06 M/UL (ref 4.05–5.2)
SODIUM SERPL-SCNC: 137 MMOL/L (ref 136–146)
WBC # BLD AUTO: 4.3 K/UL (ref 4.3–11.1)

## 2024-01-21 PROCEDURE — 80053 COMPREHEN METABOLIC PANEL: CPT

## 2024-01-21 PROCEDURE — 99283 EMERGENCY DEPT VISIT LOW MDM: CPT

## 2024-01-21 PROCEDURE — 81025 URINE PREGNANCY TEST: CPT

## 2024-01-21 PROCEDURE — 85025 COMPLETE CBC W/AUTO DIFF WBC: CPT

## 2024-01-21 RX ORDER — KETOROLAC TROMETHAMINE 30 MG/ML
30 INJECTION, SOLUTION INTRAMUSCULAR; INTRAVENOUS
Status: DISCONTINUED | OUTPATIENT
Start: 2024-01-21 | End: 2024-01-21

## 2024-01-21 ASSESSMENT — PAIN - FUNCTIONAL ASSESSMENT: PAIN_FUNCTIONAL_ASSESSMENT: 0-10

## 2024-01-21 NOTE — DISCHARGE INSTRUCTIONS
You were evaluated in the emergency department today for vaginal bleeding    Physical exam is very reassuring.  Negative pregnancy    Lab work is reassuring as well.  No evidence of anemia    Contact your OB/GYN tomorrow to schedule a follow-up next week    Alternate between ibuprofen and Tylenol for pain control.  You were given a dose of a medication called Toradol today that is like ibuprofen    Return to the emergency department if you are soaking through an extra thick menstrual pad and under 2 hours for more than 2 consecutive hours, soaking through a bleeding through a super large tampon and under 30 minutes for more than 2 consecutive hours, passing out or lightheadedness.

## 2024-01-21 NOTE — ED PROVIDER NOTES
Use    Smoking status: Never    Smokeless tobacco: Never   Vaping Use    Vaping Use: Never used   Substance and Sexual Activity    Alcohol use: No    Drug use: No        There are no discharge medications for this patient.       Results for orders placed or performed during the hospital encounter of 01/21/24   CBC with Auto Differential   Result Value Ref Range    WBC 4.3 4.3 - 11.1 K/uL    RBC 4.06 4.05 - 5.2 M/uL    Hemoglobin 12.8 11.7 - 15.4 g/dL    Hematocrit 39.6 35.8 - 46.3 %    MCV 97.5 82.0 - 102.0 FL    MCH 31.5 26.1 - 32.9 PG    MCHC 32.3 31.4 - 35.0 g/dL    RDW 12.9 11.9 - 14.6 %    Platelets 274 150 - 450 K/uL    MPV 10.3 9.4 - 12.3 FL    nRBC 0.00 0.0 - 0.2 K/uL    Differential Type AUTOMATED      Neutrophils % 38 (L) 43 - 78 %    Lymphocytes % 52 (H) 13 - 44 %    Monocytes % 8 4.0 - 12.0 %    Eosinophils % 1 0.5 - 7.8 %    Basophils % 1 0.0 - 2.0 %    Immature Granulocytes 0 0.0 - 5.0 %    Neutrophils Absolute 1.6 (L) 1.7 - 8.2 K/UL    Lymphocytes Absolute 2.2 0.5 - 4.6 K/UL    Monocytes Absolute 0.4 0.1 - 1.3 K/UL    Eosinophils Absolute 0.1 0.0 - 0.8 K/UL    Basophils Absolute 0.1 0.0 - 0.2 K/UL    Absolute Immature Granulocyte 0.0 0.0 - 0.5 K/UL   Comprehensive Metabolic Panel   Result Value Ref Range    Sodium 137 136 - 146 mmol/L    Potassium 3.8 3.5 - 5.1 mmol/L    Chloride 110 103 - 113 mmol/L    CO2 25 21 - 32 mmol/L    Anion Gap 2 2 - 11 mmol/L    Glucose 89 65 - 100 mg/dL    BUN 9 6 - 23 MG/DL    Creatinine 0.96 0.6 - 1.0 MG/DL    Est, Glom Filt Rate >60 >60 ml/min/1.73m2    Calcium 9.3 8.3 - 10.4 MG/DL    Total Bilirubin 0.6 0.2 - 1.1 MG/DL    ALT 18 12 - 65 U/L    AST 13 (L) 15 - 37 U/L    Alk Phosphatase 70 50 - 136 U/L    Total Protein 8.6 (H) 6.3 - 8.2 g/dL    Albumin 3.8 3.5 - 5.0 g/dL    Globulin 4.8 (H) 2.8 - 4.5 g/dL    Albumin/Globulin Ratio 0.8 0.4 - 1.6     POC Pregnancy Urine Qual   Result Value Ref Range    Preg Test, Ur Negative NEG          No orders to display

## 2024-01-21 NOTE — ED NOTES
I have reviewed discharge instructions with the patient.  The patient verbalized understanding.    Patient left ED via Discharge Method: ambulatory to Home with .    Opportunity for questions and clarification provided.       Patient given 0 scripts.         To continue your aftercare when you leave the hospital, you may receive an automated call from our care team to check in on how you are doing.  This is a free service and part of our promise to provide the best care and service to meet your aftercare needs.” If you have questions, or wish to unsubscribe from this service please call [phone].  Thank you for Choosing our [facility] Emergency Department.

## 2024-05-06 ENCOUNTER — HOSPITAL ENCOUNTER (EMERGENCY)
Age: 29
Discharge: HOME | End: 2024-05-06
Payer: COMMERCIAL

## 2024-05-06 VITALS
WEIGHT: 180 LBS | HEIGHT: 66 IN | SYSTOLIC BLOOD PRESSURE: 154 MMHG | OXYGEN SATURATION: 99 % | HEART RATE: 69 BPM | TEMPERATURE: 99.1 F | RESPIRATION RATE: 18 BRPM | BODY MASS INDEX: 28.93 KG/M2 | DIASTOLIC BLOOD PRESSURE: 94 MMHG

## 2024-05-06 DIAGNOSIS — Z11.3 SCREENING FOR STD (SEXUALLY TRANSMITTED DISEASE): Primary | ICD-10-CM

## 2024-05-06 LAB
BILIRUB UR QL: NEGATIVE
GLUCOSE UR QL STRIP.AUTO: NEGATIVE MG/DL
HCG UR QL: NEGATIVE
KETONES UR-MCNC: ABNORMAL MG/DL
LEUKOCYTE ESTERASE UR QL STRIP: NEGATIVE
NITRITE UR QL: NEGATIVE
PH UR: 6 (ref 5–9)
PROT UR QL: NEGATIVE MG/DL
RBC # UR STRIP: NEGATIVE
SERVICE CMNT-IMP: ABNORMAL
SERVICE CMNT-IMP: NORMAL
SP GR UR: 1.02 (ref 1–1.02)
UROBILINOGEN UR QL: 0.2 EU/DL (ref 0.2–1)
WET PREP GENITAL: NORMAL
WET PREP GENITAL: NORMAL

## 2024-05-06 PROCEDURE — 99283 EMERGENCY DEPT VISIT LOW MDM: CPT

## 2024-05-06 PROCEDURE — 87591 N.GONORRHOEAE DNA AMP PROB: CPT

## 2024-05-06 PROCEDURE — 81003 URINALYSIS AUTO W/O SCOPE: CPT

## 2024-05-06 PROCEDURE — 87210 SMEAR WET MOUNT SALINE/INK: CPT

## 2024-05-06 PROCEDURE — 87491 CHLMYD TRACH DNA AMP PROBE: CPT

## 2024-05-06 PROCEDURE — 81025 URINE PREGNANCY TEST: CPT

## 2024-05-07 NOTE — DISCHARGE INSTRUCTIONS
Urine sample and wet prep swab did not reveal any abnormalities.  Will call with results of gonorrhea/chlamydia testing in a few days to determine need for additional treatment.

## 2024-05-07 NOTE — ED PROVIDER NOTES
Emergency Department Provider Note       PCP: Yadira Prince APRN - NP   Age: 28 y.o.   Sex: female     DISPOSITION Decision To Discharge 05/06/2024 09:44:03 PM       ICD-10-CM    1. Screening for STD (sexually transmitted disease)  Z11.3           Medical Decision Making     Patient presents for STD screening.  She is asymptomatic on this account.  Arrives with stable vital signs in no apparent distress.    Her urinalysis and wet prep are negative.  Will discharge home and call with results of STD panel to determine need for additional treatment.     1 acute, uncomplicated illness or injury.      I independently ordered and reviewed each unique test.      History     Patient is a 28-year-old female with no pertinent past medical history presenting to the emergency department requesting STD screening.  She reports that her significant other did not return home yesterday evening and is concerned for infidelity.  She denies any vaginal discharge, bleeding, pelvic pain, genital rash or lesions.  She has no further complaints    The history is provided by the patient.       Physical Exam     Vitals signs and nursing note reviewed:  Vitals:    05/06/24 2041   BP: (!) 154/94   Pulse: 69   Resp: 18   Temp: 99.1 °F (37.3 °C)   TempSrc: Oral   SpO2: 99%   Weight: 81.6 kg (180 lb)   Height: 1.676 m (5' 6\")      Physical Exam  Vitals and nursing note reviewed.   Constitutional:       General: She is not in acute distress.     Appearance: Normal appearance. She is not ill-appearing.   HENT:      Head: Normocephalic and atraumatic.      Right Ear: External ear normal.      Left Ear: External ear normal.      Nose: Nose normal.   Eyes:      General: No scleral icterus.        Right eye: No discharge.         Left eye: No discharge.      Extraocular Movements: Extraocular movements intact.      Conjunctiva/sclera: Conjunctivae normal.      Pupils: Pupils are equal, round, and reactive to light.   Cardiovascular:      Rate and

## 2024-05-30 ENCOUNTER — APPOINTMENT (OUTPATIENT)
Dept: GENERAL RADIOLOGY | Age: 29
End: 2024-05-30
Payer: COMMERCIAL

## 2024-05-30 ENCOUNTER — HOSPITAL ENCOUNTER (EMERGENCY)
Age: 29
Discharge: HOME | End: 2024-05-30
Attending: EMERGENCY MEDICINE
Payer: COMMERCIAL

## 2024-05-30 VITALS
HEIGHT: 66 IN | HEART RATE: 69 BPM | WEIGHT: 180 LBS | OXYGEN SATURATION: 100 % | RESPIRATION RATE: 16 BRPM | TEMPERATURE: 98.3 F | SYSTOLIC BLOOD PRESSURE: 137 MMHG | DIASTOLIC BLOOD PRESSURE: 93 MMHG | BODY MASS INDEX: 28.93 KG/M2

## 2024-05-30 DIAGNOSIS — S92.324A CLOSED NONDISPLACED FRACTURE OF SECOND METATARSAL BONE OF RIGHT FOOT, INITIAL ENCOUNTER: Primary | ICD-10-CM

## 2024-05-30 DIAGNOSIS — S99.921A INJURY OF RIGHT FOOT, INITIAL ENCOUNTER: ICD-10-CM

## 2024-05-30 PROCEDURE — 99283 EMERGENCY DEPT VISIT LOW MDM: CPT

## 2024-05-30 PROCEDURE — 6370000000 HC RX 637 (ALT 250 FOR IP): Performed by: EMERGENCY MEDICINE

## 2024-05-30 PROCEDURE — 73630 X-RAY EXAM OF FOOT: CPT

## 2024-05-30 RX ORDER — IBUPROFEN 800 MG/1
800 TABLET ORAL EVERY 8 HOURS PRN
Qty: 21 TABLET | Refills: 0 | Status: SHIPPED | OUTPATIENT
Start: 2024-05-30 | End: 2024-06-06

## 2024-05-30 RX ORDER — IBUPROFEN 800 MG/1
800 TABLET ORAL
Status: COMPLETED | OUTPATIENT
Start: 2024-05-30 | End: 2024-05-30

## 2024-05-30 RX ADMIN — IBUPROFEN 800 MG: 800 TABLET ORAL at 20:01

## 2024-05-30 ASSESSMENT — ENCOUNTER SYMPTOMS
SHORTNESS OF BREATH: 0
NAUSEA: 0
BACK PAIN: 0
ABDOMINAL PAIN: 0

## 2024-05-30 ASSESSMENT — PAIN SCALES - GENERAL: PAINLEVEL_OUTOF10: 5

## 2024-05-30 ASSESSMENT — LIFESTYLE VARIABLES
HOW OFTEN DO YOU HAVE A DRINK CONTAINING ALCOHOL: MONTHLY OR LESS
HOW MANY STANDARD DRINKS CONTAINING ALCOHOL DO YOU HAVE ON A TYPICAL DAY: 1 OR 2

## 2024-05-30 ASSESSMENT — PAIN DESCRIPTION - LOCATION: LOCATION: FOOT

## 2024-05-30 ASSESSMENT — PAIN DESCRIPTION - ORIENTATION: ORIENTATION: RIGHT

## 2024-05-30 ASSESSMENT — PAIN - FUNCTIONAL ASSESSMENT: PAIN_FUNCTIONAL_ASSESSMENT: 0-10
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
## 2024-05-30 NOTE — DISCHARGE INSTRUCTIONS
Wear postop boot/walking boot as directed.  Weightbearing as tolerated.  Take pain medication as prescribed.  Schedule close follow-up with [facility].  Please return to ED if symptoms worsen or progress in any way.

## 2024-05-31 NOTE — ED NOTES
Patient mobility status  with no difficulty. Provider aware     I have reviewed discharge instructions with the patient.  The patient verbalized understanding.    Patient left ED via Discharge Method: wheelchair to Home with Child.    Opportunity for questions and clarification provided.     Patient given 1 scripts.           Mary Al RN  05/30/24 2007

## 2024-06-03 ENCOUNTER — OFFICE VISIT (OUTPATIENT)
Dept: ORTHOPEDIC SURGERY | Age: 29
End: 2024-06-03
Payer: COMMERCIAL

## 2024-06-03 DIAGNOSIS — R52 PAIN: Primary | ICD-10-CM

## 2024-06-03 DIAGNOSIS — M79.671 PAIN OF RIGHT MIDFOOT: ICD-10-CM

## 2024-06-03 DIAGNOSIS — S93.324A LISFRANC DISLOCATION, RIGHT, INITIAL ENCOUNTER: ICD-10-CM

## 2024-06-03 PROCEDURE — 99204 OFFICE O/P NEW MOD 45 MIN: CPT | Performed by: PHYSICIAN ASSISTANT

## 2024-06-03 NOTE — PROGRESS NOTES
metatarsal base .  No open wounds.  Moderate edema present.  normal silverskoid exam: With the hindfoot in neutral and forefoot supinated there is good ankle dorsiflexion with the knee flexed and extended.   Neutral hindfoot alignment.  No cavovarus nor planovalgus foot deformity      Neuro:  normal SILT to s/s/sp/dp/t.  Reflexes normal: 1+ patella reflex bilaterally, 1+ achilles reflex bilaterally, negative babinski bilaterally. no signs of hyper reflexia or absent reflex    Vascular:  Left            Right  DP 2+        DP 2+  PT 2+        PT 2+     Imaging:   Today I personally reviewed:     X-Ray RIGHT Foot 3 vw (AP/Lateral/Oblique) for foot pain   Findings: Fracture of the base of the second metatarsal.  Lateral displacement of metatarsals suggestive of Lisfranc injury.   Impression: Lisfranc dislocation with second metatarsal base fracture of right foot         I personally interpreted radiographs from an outside facility from a another physician:      XR FOOT RIGHT (MIN 3 VIEWS)    Result Date: 5/30/2024  EXAM: PLAIN FILM FOOT RIGHT HISTORY: Pain. TECHNIQUE: 3 views of the foot  are submitted for review. COMPARISON:  None available FINDINGS: Evaluation for Lisfranc injury is limited given the lack of standing views. Joint spaces are within normal limits.    Bone mineralization is within normal. Soft tissues are edematous. Fracture of the base of the second digit metatarsal bone not excluded.     1. Soft tissue edema. 2. Fracture of the base of the second digit metatarsal bone not excluded.

## 2024-06-11 ENCOUNTER — CLINICAL DOCUMENTATION (OUTPATIENT)
Dept: ORTHOPEDIC SURGERY | Age: 29
End: 2024-06-11

## 2024-06-11 DIAGNOSIS — G89.18 POST-OP PAIN: Primary | ICD-10-CM

## 2024-06-11 RX ORDER — ASPIRIN 81 MG/1
81 TABLET ORAL 2 TIMES DAILY
Qty: 60 TABLET | Refills: 0 | Status: SHIPPED | OUTPATIENT
Start: 2024-06-11 | End: 2024-07-11

## 2024-06-11 RX ORDER — DOCUSATE SODIUM 100 MG/1
100 CAPSULE, LIQUID FILLED ORAL DAILY PRN
Qty: 30 CAPSULE | Refills: 0 | Status: SHIPPED | OUTPATIENT
Start: 2024-06-11

## 2024-06-11 RX ORDER — ONDANSETRON 4 MG/1
4 TABLET, FILM COATED ORAL DAILY PRN
Qty: 30 TABLET | Refills: 0 | Status: SHIPPED | OUTPATIENT
Start: 2024-06-11

## 2024-06-11 RX ORDER — OXYCODONE HYDROCHLORIDE 5 MG/1
5 TABLET ORAL EVERY 6 HOURS PRN
Qty: 20 TABLET | Refills: 0 | Status: SHIPPED | OUTPATIENT
Start: 2024-06-11 | End: 2024-06-16

## 2025-04-16 ENCOUNTER — HOSPITAL ENCOUNTER (EMERGENCY)
Age: 30
Discharge: LEFT BEFORE DISCHARGE | End: 2025-04-16
Attending: EMERGENCY MEDICINE
Payer: COMMERCIAL

## 2025-04-16 VITALS
HEART RATE: 80 BPM | BODY MASS INDEX: 28.12 KG/M2 | HEIGHT: 66 IN | WEIGHT: 175 LBS | SYSTOLIC BLOOD PRESSURE: 132 MMHG | TEMPERATURE: 98.8 F | DIASTOLIC BLOOD PRESSURE: 90 MMHG | OXYGEN SATURATION: 100 % | RESPIRATION RATE: 16 BRPM

## 2025-04-16 LAB
APPEARANCE UR: CLEAR
BACTERIA URNS QL MICRO: NORMAL /HPF
BILIRUB UR QL: NEGATIVE
COLOR UR: YELLOW
EPI CELLS #/AREA URNS HPF: NORMAL /HPF
GLUCOSE UR STRIP.AUTO-MCNC: NEGATIVE MG/DL
HCG UR QL: NEGATIVE
HGB UR QL STRIP: ABNORMAL
KETONES UR QL STRIP.AUTO: NEGATIVE MG/DL
LEUKOCYTE ESTERASE UR QL STRIP.AUTO: NEGATIVE
MUCOUS THREADS URNS QL MICRO: 0 /LPF
NITRITE UR QL STRIP.AUTO: NEGATIVE
OTHER OBSERVATIONS: NORMAL
PH UR STRIP: 5.5 (ref 5–9)
PROT UR STRIP-MCNC: NEGATIVE MG/DL
RBC #/AREA URNS HPF: NORMAL /HPF
SERVICE CMNT-IMP: NORMAL
SP GR UR REFRACTOMETRY: 1.01 (ref 1–1.02)
UROBILINOGEN UR QL STRIP.AUTO: 0.2 EU/DL (ref 0.2–1)
WBC URNS QL MICRO: NORMAL /HPF
WET PREP GENITAL: NORMAL

## 2025-04-16 PROCEDURE — 87491 CHLMYD TRACH DNA AMP PROBE: CPT

## 2025-04-16 PROCEDURE — 81025 URINE PREGNANCY TEST: CPT

## 2025-04-16 PROCEDURE — 87591 N.GONORRHOEAE DNA AMP PROB: CPT

## 2025-04-16 PROCEDURE — 81001 URINALYSIS AUTO W/SCOPE: CPT

## 2025-04-16 PROCEDURE — 87210 SMEAR WET MOUNT SALINE/INK: CPT

## 2025-04-16 RX ORDER — METRONIDAZOLE 500 MG/1
500 TABLET ORAL 2 TIMES DAILY
Qty: 14 TABLET | Refills: 0 | Status: SHIPPED | OUTPATIENT
Start: 2025-04-16 | End: 2025-04-16

## 2025-04-16 ASSESSMENT — PAIN - FUNCTIONAL ASSESSMENT: PAIN_FUNCTIONAL_ASSESSMENT: NONE - DENIES PAIN

## 2025-04-17 NOTE — ED TRIAGE NOTES
Patient verified by name and  prior to triage. Pt presents to the ER with steady gait.  Pt accompianed by self.  Pt and/or family reports concern for STD and is requesting testing. Pt reports vaginal discharge.

## 2025-04-17 NOTE — ED NOTES
Pt and/or family provided with urine cup and wipes.  Pt and/or family instructed on clean catch urine sample technique.  Pt and/or family verbalized understanding.  Pt also given swabs for STD testing and instructed on use.  Pt verbalized understanding.
